# Patient Record
Sex: MALE | Race: WHITE | NOT HISPANIC OR LATINO | URBAN - METROPOLITAN AREA
[De-identification: names, ages, dates, MRNs, and addresses within clinical notes are randomized per-mention and may not be internally consistent; named-entity substitution may affect disease eponyms.]

---

## 2021-08-30 ENCOUNTER — OFFICE VISIT (OUTPATIENT)
Dept: URGENT CARE | Facility: CLINIC | Age: 36
End: 2021-08-30
Payer: COMMERCIAL

## 2021-08-30 VITALS
OXYGEN SATURATION: 100 % | BODY MASS INDEX: 22.43 KG/M2 | WEIGHT: 148 LBS | DIASTOLIC BLOOD PRESSURE: 72 MMHG | HEIGHT: 68 IN | SYSTOLIC BLOOD PRESSURE: 135 MMHG | RESPIRATION RATE: 16 BRPM | HEART RATE: 61 BPM | TEMPERATURE: 97.9 F

## 2021-08-30 DIAGNOSIS — M75.21 BICIPITAL TENDINITIS OF SHOULDER, RIGHT: Primary | ICD-10-CM

## 2021-08-30 PROCEDURE — G0382 LEV 3 HOSP TYPE B ED VISIT: HCPCS | Performed by: PHYSICIAN ASSISTANT

## 2021-08-30 RX ORDER — PREDNISONE 50 MG/1
50 TABLET ORAL DAILY
Qty: 5 TABLET | Refills: 0 | Status: SHIPPED | OUTPATIENT
Start: 2021-08-30 | End: 2021-09-04

## 2021-08-30 NOTE — PROGRESS NOTES
[address] [facility]        NAME: Roxi Benavidez is a 43 y o  male  : 1985    MRN: [id number]  DATE: 2021  TIME: 5:22 PM    Assessment and Plan   Bicipital tendinitis of shoulder, right [M75 21]  1  Bicipital tendinitis of shoulder, right  Ambulatory referral to Physical Therapy    Ambulatory referral to Orthopedic Surgery    predniSONE 50 mg tablet         Patient Instructions      Begin using prednisone as prescribed  Take this medication the morning as it can cause difficulty with sleep if taken too close to bedtime  Use topical pain patches as needed   Tylenol as needed   limit repetitive motion or heavy lifting with right arm  Advance activity as tolerated   follow-up with orthopedics or physical therapy as discussed for continued symptoms   proceed the ER with any worsening of symptoms  Follow up with PCP in 3-5 days  Proceed to  ER if symptoms worsen  Chief Complaint     Chief Complaint   Patient presents with    Shoulder Pain     started yesterday after playing with his daughter  History of Present Illness       43year old male presents to the office for c/o of  Right shoulder pain that began for him yesterday  Patient is right-hand dominant  Patient notes that he was lying directly on the shoulder reading his daughter a book when the pain began  Denies any trauma to the area or falling  He notes pain anteriorly near the bicipital groove with pain increasing with lifting the arm forward above shoulder level or out to the side  He also has pain with bringing arm back down to center  Denies any previous injury to this area  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  He is use over-the-counter NSAIDs as well as topical patch to help with the symptoms are much relief  Shoulder Pain   The pain is present in the right shoulder  This is a new problem  There has been no history of extremity trauma   The pain is at a severity of 8/10 (with movement   0/10 at rest )  Associated symptoms include a limited range of motion and stiffness  Pertinent negatives include no fever, joint swelling, numbness or tingling  The symptoms are aggravated by activity (mvt)  He has tried NSAIDS for the symptoms  The treatment provided mild relief  Family history does not include gout or rheumatoid arthritis  There is no history of osteoarthritis or rheumatoid arthritis  Review of Systems   Review of Systems   Constitutional: Negative for chills, fatigue and fever  Respiratory: Negative for cough and shortness of breath  Cardiovascular: Negative for chest pain and palpitations  Musculoskeletal: Positive for arthralgias, myalgias and stiffness  Negative for joint swelling  Skin: Negative for color change, rash and wound  Neurological: Negative for tingling and numbness  Current Medications       Current Outpatient Medications:     predniSONE 50 mg tablet, Take 1 tablet (50 mg total) by mouth daily for 5 days, Disp: 5 tablet, Rfl: 0    Current Allergies     Allergies as of 08/30/2021    (No Known Allergies)            The following portions of the patient's history were reviewed and updated as appropriate: allergies, current medications, past family history, past medical history, past social history, past surgical history and problem list      No past medical history on file  No past surgical history on file  No family history on file  Medications have been verified  Objective   /72 (Patient Position: Sitting)   Pulse 61   Temp 97 9 °F (36 6 °C) (Temporal)   Resp 16   Ht 5' 8" (1 727 m)   Wt 67 1 kg (148 lb)   SpO2 100%   BMI 22 50 kg/m²   No LMP for male patient  Physical Exam     Physical Exam  Vitals and nursing note reviewed  Constitutional:       General: He is not in acute distress  Appearance: He is well-developed  HENT:      Head: Normocephalic and atraumatic        Right Ear: Hearing and external ear normal       Left Ear: Hearing and external ear normal       Nose: Nose normal    Eyes:      Conjunctiva/sclera: Conjunctivae normal       Pupils: Pupils are equal, round, and reactive to light  Cardiovascular:      Rate and Rhythm: Normal rate and regular rhythm  Pulses: Normal pulses  Heart sounds: Normal heart sounds  No murmur heard  No friction rub  No gallop  Pulmonary:      Effort: Pulmonary effort is normal  No respiratory distress  Breath sounds: Normal breath sounds  No wheezing or rales  Musculoskeletal:         General: No deformity  Right shoulder: Tenderness present  No swelling or bony tenderness  Decreased range of motion  Decreased strength  Arms:       Cervical back: Normal range of motion  Comments:   Mildly positive Yergason's  Positive pain with external rotation   Skin:     General: Skin is warm and dry  Capillary Refill: Capillary refill takes less than 2 seconds  Findings: No ecchymosis, erythema or laceration  Neurological:      Mental Status: He is alert  Sensory: No sensory deficit  Motor: No abnormal muscle tone  Deep Tendon Reflexes: Reflexes normal    Psychiatric:         Behavior: Behavior is cooperative

## 2021-08-30 NOTE — LETTER
August 30, 2021     Patient: Lindy Kelly   YOB: 1985   Date of Visit: 8/30/2021       To Whom it May Concern:    Lindy Kelly was seen in my clinic on 8/30/2021  He may return to work on 9/02/2021  If you have any questions or concerns, please don't hesitate to call           Sincerely,          Otto Callahan PA-C

## 2021-08-30 NOTE — PATIENT INSTRUCTIONS
Begin using prednisone as prescribed  Take this medication the morning as it can cause difficulty with sleep if taken too close to bedtime  Use topical pain patches as needed   Tylenol as needed   limit repetitive motion or heavy lifting with right arm  Advance activity as tolerated   follow-up with orthopedics or physical therapy as discussed for continued symptoms   proceed the ER with any worsening of symptoms    Tendinitis   WHAT YOU NEED TO KNOW:   Tendinitis is painful inflammation or breakdown of your tendons  It may also be called tendinopathy  Tendinitis often occurs in the knee, shoulder, ankle, hip, or elbow  DISCHARGE INSTRUCTIONS:   Medicines:   · Pain medicines  such as acetaminophen or NSAIDs may decrease swelling and pain or fever  These medicines are available without a doctor's order  Ask which medicine to take  Ask how much to take and when to take it  Follow directions  Acetaminophen and NSAIDs can cause liver or kidney damage if not taken correctly  If you take blood thinner medicine, always ask your healthcare provider if NSAIDs are safe for you  Always read the medicine label and follow the directions on it before using these medicine  · Take your medicine as directed  Contact your healthcare provider if you think your medicine is not helping or if you have side effects  Tell him if you are allergic to any medicine  Keep a list of the medicines, vitamins, and herbs you take  Include the amounts, and when and why you take them  Bring the list or the pill bottles to follow-up visits  Carry your medicine list with you in case of an emergency  Management:   · Rest  your tendon as directed to help it heal  Ask your healthcare provider if you need to stop putting weight on your affected area  · Ice  helps decrease swelling and pain  Ice may also help prevent tissue damage  Use an ice pack, or put crushed ice in a plastic bag   Cover it with a towel and place it on the affected area for 10 to 15 minutes every hour or as directed  · Support devices  such as a cane, splint, shoe insert, or brace may help reduce your pain  · Physical therapy  may be ordered by your healthcare provider  This may be used to teach you exercises to help improve movement and strength, and to decrease pain  You may also learn how to improve your posture, and how to lift or exercise correctly  Prevention:   · Stretch and warm up  before you exercise  · Exercise regularly  to strengthen the muscles around your joint  Ease into an exercise routine for the first 3 weeks to prevent another injury  Ask your healthcare provider about the best exercise plan for you  Rest fully between activities  · Use the right equipment  for sports and exercise  Wear braces or tape around weak joints as directed  Follow up with your healthcare provider as directed:  Write down your questions so you remember to ask them during your visits  Contact your healthcare provider if:   · You have increased pain even after you take medicine  · You have questions or concerns about your condition or care  Return to the emergency department if:   · You have increased redness over the joint, or swelling in the joint  · You suddenly cannot move your joint  © Copyright [employer] 2021 Information is for End User's use only and may not be sold, redistributed or otherwise used for commercial purposes  All illustrations and images included in CareNotes® are the copyrighted property of [employer]  or [employer]  The above information is an  only  It is not intended as medical advice for individual conditions or treatments  Talk to your doctor, nurse or pharmacist before following any medical regimen to see if it is safe and effective for you